# Patient Record
Sex: FEMALE | Race: WHITE | Employment: UNEMPLOYED | URBAN - METROPOLITAN AREA
[De-identification: names, ages, dates, MRNs, and addresses within clinical notes are randomized per-mention and may not be internally consistent; named-entity substitution may affect disease eponyms.]

---

## 2017-09-26 ENCOUNTER — TRANSFERRED RECORDS (OUTPATIENT)
Dept: HEALTH INFORMATION MANAGEMENT | Facility: CLINIC | Age: 32
End: 2017-09-26

## 2019-10-03 ENCOUNTER — MEDICAL CORRESPONDENCE (OUTPATIENT)
Dept: HEALTH INFORMATION MANAGEMENT | Facility: CLINIC | Age: 34
End: 2019-10-03

## 2019-10-03 ENCOUNTER — TRANSFERRED RECORDS (OUTPATIENT)
Dept: HEALTH INFORMATION MANAGEMENT | Facility: CLINIC | Age: 34
End: 2019-10-03

## 2019-10-08 ENCOUNTER — TRANSFERRED RECORDS (OUTPATIENT)
Dept: HEALTH INFORMATION MANAGEMENT | Facility: CLINIC | Age: 34
End: 2019-10-08

## 2019-10-16 ENCOUNTER — REFERRAL (OUTPATIENT)
Dept: TRANSPLANT | Facility: CLINIC | Age: 34
End: 2019-10-16

## 2019-10-16 NOTE — LETTER
Celia Munson  [address] [address] [place] [id number]        Dear Celia,    Thank you for your interest in the Transplant Center at [facility]. We look forward to being a part of your care team and assisting you through the transplant process.    As we discussed, your transplant coordinator is Elidia Moore (Kidney).  You may call your coordinator at any time with questions or concerns.  Your first scheduled call will be on 11/19/19.  If this needs to change, call [phone].    Please complete the following.    1. Fill out and return the enclosed forms    Authorization for Electronic Communication    Authorization to Discuss Protected Health Information    Authorization for Release of Protected Health Information    Authorization for Care Everywhere Release of Information    2. Sign up for:    Code71t, access to your electronic medical record (see enclosed pamphlet)    Daily Sales ExchangetransplantCharge Payment, a transplant education website    You can use these tools to learn more about your transplant, communicate with your care team, and track your medical details      Sincerely,      Solid Organ Transplant  [facility], [facility]    Cc: Marck Frances MD

## 2019-11-04 NOTE — TELEPHONE ENCOUNTER
[facility] called to follow up on the status. If the patient doesn't  please call the husbands number at [phone].

## 2019-11-04 NOTE — TELEPHONE ENCOUNTER
November 4, 2019 4:34 PM -  [id number]:   Message from Admin. Pola noted, patient's 's # verified in chart.

## 2019-11-07 VITALS — BODY MASS INDEX: 33.04 KG/M2 | HEIGHT: 61 IN | WEIGHT: 175 LBS

## 2019-11-07 ASSESSMENT — MIFFLIN-ST. JEOR: SCORE: 1441.17

## 2019-11-07 NOTE — TELEPHONE ENCOUNTER
PCP: NONE   Referring Provider: Yvrose  Referring Diagnosis:     Insurance information:  [place]  Policy mcfarlane:  SELF  Subscriber/policy/ID number:  Pt did not have info available  Group Number:      Is patient in a group home/assisted living? NO  Does patient have a guardian? NO    Referral intake process completed.  Patient is aware that after financial approval is received, medical records will be requested.   Patient confirmed for a callback from transplant coordinator on 11/19/19. (within 2 weeks)  Tentative evaluation date:  Not scheduled. (within 4 weeks)    Confirmed coordinator will discuss evaluation process in more detail at the time of their call.   Patient is aware of the need to arrange age appropriate cancer screening, vaccinations, and dental care.  Reminded patient to complete questionnaire, complete medical records release, and review packet prior to evaluation visit .  Assessed patient for special needs (ie--wheelchair, assistance, guardian, and ):  NONE   Patient instructed to call [phone] with questions.

## 2019-11-19 ENCOUNTER — TELEPHONE (OUTPATIENT)
Dept: TRANSPLANT | Facility: CLINIC | Age: 34
End: 2019-11-19

## 2019-11-19 DIAGNOSIS — Z87.891 HISTORY OF TOBACCO USE: ICD-10-CM

## 2019-11-19 DIAGNOSIS — N18.9 CHRONIC RENAL FAILURE: ICD-10-CM

## 2019-11-19 DIAGNOSIS — Z76.82 ORGAN TRANSPLANT CANDIDATE: ICD-10-CM

## 2019-11-19 DIAGNOSIS — Z01.818 PRE-TRANSPLANT EVALUATION FOR KIDNEY TRANSPLANT: ICD-10-CM

## 2019-11-19 DIAGNOSIS — Q61.5 CONGENITAL MEDULLARY CYSTIC KIDNEY: ICD-10-CM

## 2019-11-19 NOTE — TELEPHONE ENCOUNTER
Contacted patient and introduced myself and I provided Celia with Eboni Moore' name and number as she is the actual coordinator.  I described the role of the Transplant Coordinator in the transplant process.  Explained the purpose of this call including reviewing next steps and answering questions.  I confirmed Referring Provider (Dr Frances).  Celia does not yet have a Primary Care Physician and has been told to get one.      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  Recent GFR = 20.  She states there may be potential donors.  She denies cardiac problems or previous testing.  She states she smokes less that 1/2 pack per day and is trying to decrease.  She does not go to the dentist so I encouraged exam.  She is due for a pap smear.  She has been vaccinated for Hep B (is immune) but has not had Pneumovax or flu shot (I encouraged both).  BMI=33.  Reviewed components of transplant evaluation process including necessary appointments, tests, and procedures.  Answered questions for patient regarding evaluation, provided Amoss  name and contact information and requested they call with any additional questions.  Notified patients that they will hear from a Transplant  to schedule evaluation.

## 2019-11-20 NOTE — TELEPHONE ENCOUNTER
Called patient to schedule kidney eval, she confirmed for Thurs, Jan 2, 2020 w/Keys/Miriam.  Fedexing out this week to patient.

## 2019-12-26 ENCOUNTER — TELEPHONE (OUTPATIENT)
Dept: TRANSPLANT | Facility: CLINIC | Age: 34
End: 2019-12-26

## 2019-12-26 NOTE — TELEPHONE ENCOUNTER
Patient Call:   Bryce Munson left voice message on 12/24/19 at 1:01pm., Their insurance has changed not sure if they need to go through another Prior Approval  Wife Celia is scheduled for 01/02/2020 recipient   Evaluation.  Please call Celia/Bryce         Call back needed? Yes    Return Call Needed  Same as documented in contacts section  When to return call?: Same day: Route [facility]

## 2020-01-02 ENCOUNTER — ANCILLARY PROCEDURE (OUTPATIENT)
Dept: CARDIOLOGY | Facility: CLINIC | Age: 35
End: 2020-01-02
Attending: PHYSICIAN ASSISTANT
Payer: COMMERCIAL

## 2020-01-02 ENCOUNTER — ALLIED HEALTH/NURSE VISIT (OUTPATIENT)
Dept: TRANSPLANT | Facility: CLINIC | Age: 35
End: 2020-01-02
Attending: PHYSICIAN ASSISTANT
Payer: COMMERCIAL

## 2020-01-02 ENCOUNTER — DOCUMENTATION ONLY (OUTPATIENT)
Dept: TRANSPLANT | Facility: CLINIC | Age: 35
End: 2020-01-02

## 2020-01-02 ENCOUNTER — ANCILLARY PROCEDURE (OUTPATIENT)
Dept: GENERAL RADIOLOGY | Facility: CLINIC | Age: 35
End: 2020-01-02
Attending: PHYSICIAN ASSISTANT
Payer: COMMERCIAL

## 2020-01-02 VITALS
SYSTOLIC BLOOD PRESSURE: 128 MMHG | HEART RATE: 97 BPM | HEIGHT: 61 IN | TEMPERATURE: 98.4 F | DIASTOLIC BLOOD PRESSURE: 83 MMHG | WEIGHT: 181.7 LBS | BODY MASS INDEX: 34.31 KG/M2 | OXYGEN SATURATION: 97 %

## 2020-01-02 VITALS
BODY MASS INDEX: 34.31 KG/M2 | OXYGEN SATURATION: 97 % | TEMPERATURE: 98.4 F | DIASTOLIC BLOOD PRESSURE: 74 MMHG | HEIGHT: 61 IN | SYSTOLIC BLOOD PRESSURE: 114 MMHG | HEART RATE: 97 BPM | WEIGHT: 181.7 LBS

## 2020-01-02 DIAGNOSIS — N18.9 CHRONIC RENAL FAILURE: ICD-10-CM

## 2020-01-02 DIAGNOSIS — Z87.891 HISTORY OF TOBACCO USE: ICD-10-CM

## 2020-01-02 DIAGNOSIS — Z01.818 PRE-TRANSPLANT EVALUATION FOR KIDNEY TRANSPLANT: Primary | ICD-10-CM

## 2020-01-02 DIAGNOSIS — N18.4 CKD (CHRONIC KIDNEY DISEASE) STAGE 4, GFR 15-29 ML/MIN (H): ICD-10-CM

## 2020-01-02 DIAGNOSIS — N18.4 CKD (CHRONIC KIDNEY DISEASE) STAGE 4, GFR 15-29 ML/MIN (H): Primary | ICD-10-CM

## 2020-01-02 DIAGNOSIS — Z76.82 ORGAN TRANSPLANT CANDIDATE: Primary | ICD-10-CM

## 2020-01-02 DIAGNOSIS — Z72.0 CURRENT TOBACCO USE: ICD-10-CM

## 2020-01-02 DIAGNOSIS — Q61.5 CONGENITAL MEDULLARY CYSTIC KIDNEY: ICD-10-CM

## 2020-01-02 DIAGNOSIS — Z76.82 ORGAN TRANSPLANT CANDIDATE: ICD-10-CM

## 2020-01-02 DIAGNOSIS — Z01.818 PRE-TRANSPLANT EVALUATION FOR KIDNEY TRANSPLANT: ICD-10-CM

## 2020-01-02 LAB
ABO + RH BLD: NORMAL
ALBUMIN SERPL-MCNC: 3.6 G/DL (ref 3.4–5)
ALBUMIN UR-MCNC: 100 MG/DL
ALP SERPL-CCNC: 119 U/L (ref 40–150)
ALT SERPL W P-5'-P-CCNC: 21 U/L (ref 0–50)
ANION GAP SERPL CALCULATED.3IONS-SCNC: 5 MMOL/L (ref 3–14)
APPEARANCE UR: CLEAR
APTT PPP: 27 SEC (ref 22–37)
AST SERPL W P-5'-P-CCNC: 9 U/L (ref 0–45)
B-HCG SERPL-ACNC: <1 IU/L (ref 0–5)
BASOPHILS # BLD AUTO: 0.1 10E9/L (ref 0–0.2)
BASOPHILS NFR BLD AUTO: 0.5 %
BILIRUB SERPL-MCNC: 0.2 MG/DL (ref 0.2–1.3)
BILIRUB UR QL STRIP: NEGATIVE
BLD GP AB SCN SERPL QL: NORMAL
BLOOD BANK CMNT PATIENT-IMP: NORMAL
BUN SERPL-MCNC: 16 MG/DL (ref 7–30)
CALCIUM SERPL-MCNC: 8.9 MG/DL (ref 8.5–10.1)
CHLORIDE SERPL-SCNC: 106 MMOL/L (ref 94–109)
CO2 SERPL-SCNC: 24 MMOL/L (ref 20–32)
COLOR UR AUTO: ABNORMAL
CREAT SERPL-MCNC: 2.62 MG/DL (ref 0.52–1.04)
DIFFERENTIAL METHOD BLD: ABNORMAL
EOSINOPHIL # BLD AUTO: 0.4 10E9/L (ref 0–0.7)
EOSINOPHIL NFR BLD AUTO: 3.2 %
ERYTHROCYTE [DISTWIDTH] IN BLOOD BY AUTOMATED COUNT: 12.5 % (ref 10–15)
GFR SERPL CREATININE-BSD FRML MDRD: 23 ML/MIN/{1.73_M2}
GLUCOSE SERPL-MCNC: 80 MG/DL (ref 70–99)
GLUCOSE UR STRIP-MCNC: NEGATIVE MG/DL
HBV CORE AB SERPL QL IA: NONREACTIVE
HBV SURFACE AB SERPL IA-ACNC: 14.46 M[IU]/ML
HBV SURFACE AG SERPL QL IA: NONREACTIVE
HCT VFR BLD AUTO: 45.4 % (ref 35–47)
HCV AB SERPL QL IA: NONREACTIVE
HGB BLD-MCNC: 14.9 G/DL (ref 11.7–15.7)
HGB UR QL STRIP: NEGATIVE
HIV 1+2 AB+HIV1 P24 AG SERPL QL IA: NONREACTIVE
IMM GRANULOCYTES # BLD: 0.1 10E9/L (ref 0–0.4)
IMM GRANULOCYTES NFR BLD: 0.7 %
INR PPP: 0.95 (ref 0.86–1.14)
INTERPRETATION ECG - MUSE: NORMAL
KETONES UR STRIP-MCNC: NEGATIVE MG/DL
LEUKOCYTE ESTERASE UR QL STRIP: NEGATIVE
LYMPHOCYTES # BLD AUTO: 3.1 10E9/L (ref 0.8–5.3)
LYMPHOCYTES NFR BLD AUTO: 23.6 %
MCH RBC QN AUTO: 31.1 PG (ref 26.5–33)
MCHC RBC AUTO-ENTMCNC: 32.8 G/DL (ref 31.5–36.5)
MCV RBC AUTO: 95 FL (ref 78–100)
MONOCYTES # BLD AUTO: 0.6 10E9/L (ref 0–1.3)
MONOCYTES NFR BLD AUTO: 4.3 %
NEUTROPHILS # BLD AUTO: 8.8 10E9/L (ref 1.6–8.3)
NEUTROPHILS NFR BLD AUTO: 67.7 %
NITRATE UR QL: NEGATIVE
NRBC # BLD AUTO: 0 10*3/UL
NRBC BLD AUTO-RTO: 0 /100
PH UR STRIP: 6 PH (ref 5–7)
PLATELET # BLD AUTO: 182 10E9/L (ref 150–450)
POTASSIUM SERPL-SCNC: 3.4 MMOL/L (ref 3.4–5.3)
PROT SERPL-MCNC: 8 G/DL (ref 6.8–8.8)
RBC # BLD AUTO: 4.79 10E12/L (ref 3.8–5.2)
RBC #/AREA URNS AUTO: <1 /HPF (ref 0–2)
SODIUM SERPL-SCNC: 134 MMOL/L (ref 133–144)
SOURCE: ABNORMAL
SP GR UR STRIP: 1.01 (ref 1–1.03)
SPECIMEN EXP DATE BLD: NORMAL
SPECIMEN EXP DATE BLD: NORMAL
SQUAMOUS #/AREA URNS AUTO: 1 /HPF (ref 0–1)
UROBILINOGEN UR STRIP-MCNC: 0 MG/DL (ref 0–2)
WBC # BLD AUTO: 12.9 10E9/L (ref 4–11)
WBC #/AREA URNS AUTO: <1 /HPF (ref 0–5)

## 2020-01-02 PROCEDURE — 85670 THROMBIN TIME PLASMA: CPT | Performed by: PHYSICIAN ASSISTANT

## 2020-01-02 PROCEDURE — 86901 BLOOD TYPING SEROLOGIC RH(D): CPT | Performed by: PHYSICIAN ASSISTANT

## 2020-01-02 PROCEDURE — 87340 HEPATITIS B SURFACE AG IA: CPT | Performed by: PHYSICIAN ASSISTANT

## 2020-01-02 PROCEDURE — 36415 COLL VENOUS BLD VENIPUNCTURE: CPT | Performed by: PHYSICIAN ASSISTANT

## 2020-01-02 PROCEDURE — 86900 BLOOD TYPING SEROLOGIC ABO: CPT | Performed by: PHYSICIAN ASSISTANT

## 2020-01-02 PROCEDURE — 84702 CHORIONIC GONADOTROPIN TEST: CPT | Performed by: PHYSICIAN ASSISTANT

## 2020-01-02 PROCEDURE — 85610 PROTHROMBIN TIME: CPT | Performed by: PHYSICIAN ASSISTANT

## 2020-01-02 PROCEDURE — 86481 TB AG RESPONSE T-CELL SUSP: CPT | Performed by: PHYSICIAN ASSISTANT

## 2020-01-02 PROCEDURE — 86706 HEP B SURFACE ANTIBODY: CPT | Performed by: PHYSICIAN ASSISTANT

## 2020-01-02 PROCEDURE — G0463 HOSPITAL OUTPT CLINIC VISIT: HCPCS | Mod: ZF

## 2020-01-02 PROCEDURE — G0463 HOSPITAL OUTPT CLINIC VISIT: HCPCS | Mod: 27

## 2020-01-02 PROCEDURE — 85025 COMPLETE CBC W/AUTO DIFF WBC: CPT | Performed by: PHYSICIAN ASSISTANT

## 2020-01-02 PROCEDURE — 85730 THROMBOPLASTIN TIME PARTIAL: CPT | Performed by: PHYSICIAN ASSISTANT

## 2020-01-02 PROCEDURE — 86780 TREPONEMA PALLIDUM: CPT | Performed by: PHYSICIAN ASSISTANT

## 2020-01-02 PROCEDURE — 86886 COOMBS TEST INDIRECT TITER: CPT | Performed by: PHYSICIAN ASSISTANT

## 2020-01-02 PROCEDURE — 86803 HEPATITIS C AB TEST: CPT | Performed by: PHYSICIAN ASSISTANT

## 2020-01-02 PROCEDURE — 86147 CARDIOLIPIN ANTIBODY EA IG: CPT | Performed by: PHYSICIAN ASSISTANT

## 2020-01-02 PROCEDURE — 97802 MEDICAL NUTRITION INDIV IN: CPT | Mod: ZF

## 2020-01-02 PROCEDURE — 81240 F2 GENE: CPT | Performed by: PHYSICIAN ASSISTANT

## 2020-01-02 PROCEDURE — 86787 VARICELLA-ZOSTER ANTIBODY: CPT | Performed by: PHYSICIAN ASSISTANT

## 2020-01-02 PROCEDURE — 86850 RBC ANTIBODY SCREEN: CPT | Performed by: PHYSICIAN ASSISTANT

## 2020-01-02 PROCEDURE — 86704 HEP B CORE ANTIBODY TOTAL: CPT | Performed by: PHYSICIAN ASSISTANT

## 2020-01-02 PROCEDURE — 86644 CMV ANTIBODY: CPT | Performed by: PHYSICIAN ASSISTANT

## 2020-01-02 PROCEDURE — 81241 F5 GENE: CPT | Performed by: PHYSICIAN ASSISTANT

## 2020-01-02 PROCEDURE — 40000866 ZZHCL STATISTIC HIV 1/2 ANTIGEN/ANTIBODY PRETRANSPLANT ONLY: Performed by: PHYSICIAN ASSISTANT

## 2020-01-02 PROCEDURE — 86665 EPSTEIN-BARR CAPSID VCA: CPT | Performed by: PHYSICIAN ASSISTANT

## 2020-01-02 PROCEDURE — 86905 BLOOD TYPING RBC ANTIGENS: CPT | Performed by: PHYSICIAN ASSISTANT

## 2020-01-02 PROCEDURE — 80053 COMPREHEN METABOLIC PANEL: CPT | Performed by: PHYSICIAN ASSISTANT

## 2020-01-02 PROCEDURE — 81001 URINALYSIS AUTO W/SCOPE: CPT | Performed by: PHYSICIAN ASSISTANT

## 2020-01-02 PROCEDURE — 85613 RUSSELL VIPER VENOM DILUTED: CPT | Performed by: PHYSICIAN ASSISTANT

## 2020-01-02 RX ORDER — LOSARTAN POTASSIUM 25 MG/1
25 TABLET ORAL
COMMUNITY
Start: 2018-11-20

## 2020-01-02 ASSESSMENT — PAIN SCALES - GENERAL
PAINLEVEL: NO PAIN (0)
PAINLEVEL: NO PAIN (0)

## 2020-01-02 ASSESSMENT — MIFFLIN-ST. JEOR
SCORE: 1471.57
SCORE: 1471.57

## 2020-01-02 NOTE — PROGRESS NOTES
"Kidney Transplant Referral - 10/16/2019  Celia Munson attended the pre-transplant patient education class today with her .The [facility] website pre-transplant modules were viewed; class participants were educated on using the site. Pt.and  attentive and verbalized understanding of content presented.     Content reviewed:    Living Donation and how to access that program    Paired exchange    Kidney Donor Profile Index (KDPI)    Waiting list issues (right to decline without penalty, high PHS risk donors, what to expect when called with an offer)    Hospital experience,  length of stay , need to stay locally post-discharge (2-4 weeks)    Surgical options (with pictures)                             Post-surgery lifting and driving restrictions    Post-transplant routines, frequency of lab work and clinic visits    Need to stay locally post-discharge (2-4 weeks)    Role of Transplant Coordinator    Participants were informed of the benefits of transplant as well as potential risks such as infection, cancer, and death.  The need for total adherence with immunosuppression medications and following transplant regimens was stressed.  The overall evaluation/approval/listing process was reviewed.        The patient was provided with the following documents:  What You Need to Know About a Kidney Transplant  Adult Kidney Transplant - A Guide for Patients  SRTR Data Sheet - Kidney  Brochure - Kidney Allocation  Brochure - Multiple Listing and Waiting Time Transfer  What Every Patient Needs to Know (UNOS)  UNOS Facts and Figures  Finding a Donor  [facility] - Quick Start Guide  PI Consent  Receipt of Information form    Celia Munson signed the  Receipt of Information for Organ Transplant Recipient.\" She was provided Elidia Moore's business card and instructed to call with additional questions. She also was provided with several donor  cards.      Summary    Team s concerns/comments:  1. " Health Maintenance  2. CAD-low risk  3. Smoking    Candidacy category: Yellow    Action/Plan:  1.PAP, dental need to be updated.  2. No need for Cardiology at this time  3. Cessation. No PFTs ordered at this time        Expected Selection Meeting Discussion:1/9/2020

## 2020-01-02 NOTE — LETTER
1/2/2020      RE: Celia Munson  [address]  [facility] [address]       Assessment and Plan:  # Kidney Transplant Evaluation: Patient is a good candidate overall. Benefits of a living donor transplant were discussed.    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 Her eGFR has been relatively stable around 20-23 ml/min over the past few years. When ready, she would likely benefit from a kidney transplant.    # Cardiac Risk: she has no known history of cardiac disease or events and is asymptomatic at a low level of exertion. EKG and ECHO pending.    # Tobacco use: currently smoking about 1/2 PPD and has been doing so for 10+ years. She is trying to quit.     # Elevated WBC: patient asymptomatic. CXR and UA without evidence of infection. Recommend repeat in a few weeks with PCP.    # Health Maintenance: PAP: Not up to date and Dental: Not up to date    Discussed the risks and benefits of a transplant, including the risk of surgery and immunosuppression medications.  Patient's overall evaluation will be discussed in the Transplant Program's regular meeting with a final recommendation on the patients suitability for transplant to be made at that time.  Patient was seen in conjunction with Dr. Payam Cooper as part of a shared visit.    Evaluation:  Celia Munson was seen in consultation at the request of Dr. Lee Pineda for evaluation as a potential kidney transplant recipient.    Reason for Visit:  Celia Munson is a 34-year-old female with CKD who presents for kidney transplant evaluation.    History of Present Illness:         Kidney Disease Hx: History of recurrent UTI and vesicoureteral reflux s/p bilateral reimplantation of the ureters at age 8. Reports minimal-to-no follow up afterwards. Continued to have recurrent UTI's following surgery, but only 1 in the past 8-10 years. Then told she had cysts on her  kidneys in 2008 during her pregnancy (did have proteinuria, delivered healthy baby 4 weeks early). Had follow up with specialist afterwards, but was told there were no cysts, so again had no follow up. 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 MRI back at that time showed atypical appearance of the kidneys, which was followed by a renal US showing dysplastic kidneys and simple cysts, and she's been followed by nephrology since. Genetic testing was done for CAKUT, but no clinically significant alterations were identified. She did, however, have CHD1L variant, the clinical significance of which was unclear. Her eGFR has been 20-23 ml/min over the past 2 years.        Kidney Disease Dx: as above       Biopsy Proven: No         On Dialysis: No       Primary Nephrologist: Dr. Frances       H/o Kidney Stones: No       H/o Recurrent/Frequent UTI: Yes          Diabetic Hx: None           Cardiac/Vascular Disease Risk Factors:        Cardiac Risk Factors: Hypertension, CKD and Smoking       Known CAD: No       Known PAD/Caludication Symptoms: No       Known Heart Failure: No       Arrhythmia: No       Pulmonary Hypertension: No       Valvular Disease: No       Other: None         Volume Status/Weight:        Volume status: Euvolemic       Weight:  Acceptable BMI       BMI: Body mass index is 34.33 kg/m .         Functional Capacity/Frailty:        She is somewhat limited by lower back pain from her work-related injury, but can walk several blocks without chest pain, SOB, or claudication. She can walk up stairs, but does have some trouble related to her back.        Fatigue/Decreased Energy: [] No [x] Yes    Chest Pain or SOB with Exertion: [x] No [] Yes    Significant Weight Change: [x] No [] Yes    Nausea, Vomiting or Diarrhea: [x] No [] Yes    Fever, Sweats or Chills:  [x] No [] Yes    Leg Swelling [x] No [] Yes         History of Cancer: None    Other Significant Medical Issues:    None    Review of Systems:  A comprehensive review of systems was obtained and negative, except as noted in the HPI or PMH.    Past Medical History:   Medical record was reviewed and PMH was discussed with patient and noted below.  Past Medical History:   Diagnosis Date     CKD (chronic kidney disease) stage 4, GFR 15-29 ml/min (H)      Current tobacco use      Hypertension 2016     Multicystic dysplastic kidney      Uncomplicated asthma     as child     Vesicoureteral reflux        Past Social History:   Past Surgical History:   Procedure Laterality Date     REIMPLANT URETER CHILD  8 yrs old     TONSILLECTOMY & ADENOIDECTOMY  2011     TUBAL LIGATION  2012     Personal history of bleeding or anesthesia problems: No    Family History:  Family History   Problem Relation Age of Onset     Kidney Disease Niece        Personal History:   Social History     Socioeconomic History     Marital status:      Spouse name: Not on file     Number of children: Not on file     Years of education: Not on file     Highest education level: Not on file   Occupational History     Not on file   Social Needs     Financial resource strain: Not on file     Food insecurity:     Worry: Not on file     Inability: Not on file     Transportation needs:     Medical: Not on file     Non-medical: Not on file   Tobacco Use     Smoking status: Current Every Day Smoker     Packs/day: 0.50     Types: Cigarettes     Smokeless tobacco: Never Used   Substance and Sexual Activity     Alcohol use: Not Currently     Drug use: Not Currently     Sexual activity: Not on file   Lifestyle     Physical activity:     Days per week: Not on file     Minutes per session: Not on file     Stress: Not on file   Relationships     Social connections:     Talks on phone: Not on file     Gets together: Not on file     Attends Mosque service: Not on file     Active member of club or organization: Not on file     Attends meetings of clubs or organizations: Not on  "file     Relationship status: Not on file     Intimate partner violence:     Fear of current or ex partner: Not on file     Emotionally abused: Not on file     Physically abused: Not on file     Forced sexual activity: Not on file   Other Topics Concern     Parent/sibling w/ CABG, MI or angioplasty before 65F 55M? Not Asked   Social History Narrative     Not on file       Allergies:  No Known Allergies    Medications:  Current Outpatient Medications   Medication Sig     cholecalciferol (VITAMIN D-1000 MAX ST) 25 MCG (1000 UT) TABS Take 2,000 Units by mouth     losartan (COZAAR) 25 MG tablet Take 25 mg by mouth     No current facility-administered medications for this visit.        Vitals:  /74 (BP Location: Left arm, Patient Position: Sitting, Cuff Size: Adult Large)   Pulse 97   Temp 98.4  F (36.9  C) (Oral)   Ht 1.549 m (5' 1\")   Wt 82.4 kg (181 lb 11.2 oz)   SpO2 97%   BMI 34.33 kg/m       Exam:  GENERAL APPEARANCE: alert and no distress  HENT: mouth without ulcers or lesions. Fair dentition  LYMPHATICS: no cervical or supraclavicular nodes  RESP: lungs clear to auscultation - no rales, rhonchi or wheezes  CV: regular rhythm, normal rate, no rub, no murmur  EDEMA: no LE edema bilaterally  ABDOMEN: soft, nondistended, nontender  MS: extremities normal - no gross deformities noted, no evidence of inflammation in joints, no muscle tenderness  SKIN: no rash  DIALYSIS ACCESS:  None   Femoral pulses 2+    Results:   Recent Results (from the past 336 hour(s))   EKG 12-lead, tracing only [EKG1]    Collection Time: 01/02/20 11:41 AM   Result Value Ref Range    Interpretation ECG Click View Image link to view waveform and result    Routine UA with microscopic    Collection Time: 01/02/20 11:48 AM   Result Value Ref Range    Color Urine Straw     Appearance Urine Clear     Glucose Urine Negative NEG^Negative mg/dL    Bilirubin Urine Negative NEG^Negative    Ketones Urine Negative NEG^Negative mg/dL    Specific " Gravity Urine 1.006 1.003 - 1.035    Blood Urine Negative NEG^Negative    pH Urine 6.0 5.0 - 7.0 pH    Protein Albumin Urine 100 (A) NEG^Negative mg/dL    Urobilinogen mg/dL 0.0 0.0 - 2.0 mg/dL    Nitrite Urine Negative NEG^Negative    Leukocyte Esterase Urine Negative NEG^Negative    Source Midstream Urine     WBC Urine <1 0 - 5 /HPF    RBC Urine <1 0 - 2 /HPF    Squamous Epithelial /HPF Urine 1 0 - 1 /HPF   ABO type [[id number]]    Collection Time: 01/02/20 11:56 AM   Result Value Ref Range    ABO B     RH(D) Pos     Specimen Expires 01/05/2020    HLA Typing Complete SOT Recipient    Collection Time: 01/02/20 11:59 AM   Result Value Ref Range    ABTest Method SSOP     A* locus A*03     A* A*24     B* locus B*18     B* B*35     C* locus C*04     C* C*12     Bw-1 Bw*6     Drsso Test Method SSOP     DRB1* locus DRB1*01(103)     DRB1* DRB1*11     DRB3* locus DRB3*02     DQB1* locus DQB1*03(07)     DQB1* DQB1*05     DQA1*locus DQA1*01     DQA1* DQA1*05     DPB1* DPB1*02:01     DPB1* NMDP CDSHP     DPB1*locus DPB1*04:02     DPB1* locus NMDP CDNZA     DPA1* DPA1*01:03     DPA1* NMDP CCTGX    PRA Single Antigen IgG Antibody    Collection Time: 01/02/20 11:59 AM   Result Value Ref Range    SA1 Test Method SA FCS     SA1 Cell Class I     SA1 Hi Risk Linda None     SA1 Mod Risk Linda None     SA1 Comments        Test performed by modified procedure. Serum heat inactivated and tested   by a modified ([place]) protocol including fetal calf serum addition.   High-risk, mfi >3,000. Mod-risk, mfi 500-3,000.      SA2 Test Method SA FCS     SA2 Cell Class II     SA2 Hi Risk Linda None     SA2 Mod Risk Linda None     SA2 Comments        Test performed by modified procedure. Serum heat inactivated and tested   by a modified ([place]) protocol including fetal calf serum addition.   High-risk, mfi >3,000. Mod-risk, mfi 500-3,000.      Protocol Cutoff Plan A, 500 mfi cumulative      UNOS cPRA 0     Unacceptable Antigen None    CMV Antibody IgG  [[id number]]    Collection Time: 01/02/20 11:59 AM   Result Value Ref Range    CMV Antibody IgG 0.2 0.0 - 0.8 AI   EBV Capsid Antibody IgG [[id number]]    Collection Time: 01/02/20 11:59 AM   Result Value Ref Range    EBV Capsid Antibody IgG >8.0 (H) 0.0 - 0.8 AI   Hepatitis B core antibody [[id number]]    Collection Time: 01/02/20 11:59 AM   Result Value Ref Range    Hepatitis B Core Linda Nonreactive NR^Nonreactive   Hepatitis B Surface Antibody [[id number]]    Collection Time: 01/02/20 11:59 AM   Result Value Ref Range    Hepatitis B Surface Antibody 14.46 (H) <8.00 m[IU]/mL   Hepatitis B surface antigen [STE008]    Collection Time: 01/02/20 11:59 AM   Result Value Ref Range    Hep B Surface Agn Nonreactive NR^Nonreactive   Hepatitis C antibody [[id number]]    Collection Time: 01/02/20 11:59 AM   Result Value Ref Range    Hepatitis C Antibody Nonreactive NR^Nonreactive   HIV Antigen Antibody Combo Pretransplant    Collection Time: 01/02/20 11:59 AM   Result Value Ref Range    HIV Antigen Antibody Combo Pretransplant Nonreactive NR^Nonreactive   Varicella Zoster Virus Antibody IgG [[id number]]    Collection Time: 01/02/20 11:59 AM   Result Value Ref Range    Varicella Zoster Virus Antibody IgG >8.0 (H) 0.0 - 0.8 AI   Treponema Abs w Reflex to RPR and Titer    Collection Time: 01/02/20 11:59 AM   Result Value Ref Range    Treponema Antibodies Nonreactive NR^Nonreactive   HCG quantitative pregnancy    Collection Time: 01/02/20 11:59 AM   Result Value Ref Range    HCG Quantitative Serum <1 0 - 5 IU/L   Thrombin time [[id number]]    Collection Time: 01/02/20 11:59 AM   Result Value Ref Range    Thrombin Time 14.8 13.0 - 19.0 sec   Partial thromboplastin time [LAB56]    Collection Time: 01/02/20 11:59 AM   Result Value Ref Range    PTT 27 22 - 37 sec   INR [[id number]]    Collection Time: 01/02/20 11:59 AM   Result Value Ref Range    INR 0.95 0.86 - 1.14   Lupus Anticoagulant Panel [[id number]]    Collection Time: 01/02/20 11:59 AM   Result Value  Ref Range    Lupus Result Negative NEG^Negative   Cardiolipin Linda IgG and IgM [LAB 6836]    Collection Time: 01/02/20 11:59 AM   Result Value Ref Range    Cardiolipin Antibody IgG <1.6 0.0 - 19.9 GPL-U/mL    Cardiolipin Antibody IgM <0.2 0.0 - 19.9 MPL-U/mL   CBC with platelets differential [[id number]]    Collection Time: 01/02/20 11:59 AM   Result Value Ref Range    WBC 12.9 (H) 4.0 - 11.0 10e9/L    RBC Count 4.79 3.8 - 5.2 10e12/L    Hemoglobin 14.9 11.7 - 15.7 g/dL    Hematocrit 45.4 35.0 - 47.0 %    MCV 95 78 - 100 fl    MCH 31.1 26.5 - 33.0 pg    MCHC 32.8 31.5 - 36.5 g/dL    RDW 12.5 10.0 - 15.0 %    Platelet Count 182 150 - 450 10e9/L    Diff Method Automated Method     % Neutrophils 67.7 %    % Lymphocytes 23.6 %    % Monocytes 4.3 %    % Eosinophils 3.2 %    % Basophils 0.5 %    % Immature Granulocytes 0.7 %    Nucleated RBCs 0 0 /100    Absolute Neutrophil 8.8 (H) 1.6 - 8.3 10e9/L    Absolute Lymphocytes 3.1 0.8 - 5.3 10e9/L    Absolute Monocytes 0.6 0.0 - 1.3 10e9/L    Absolute Eosinophils 0.4 0.0 - 0.7 10e9/L    Absolute Basophils 0.1 0.0 - 0.2 10e9/L    Abs Immature Granulocytes 0.1 0 - 0.4 10e9/L    Absolute Nucleated RBC 0.0    Quantiferon TB Gold Plus    Collection Time: 01/02/20 11:59 AM   Result Value Ref Range    Quantiferon-TB Gold Plus Result Negative NEG^Negative    TB1 Ag minus Nil Value 0.00 IU/mL    TB2 Ag minus Nil Value 0.00 IU/mL    Mitogen minus Nil Result >10.00 IU/mL    Nil Result 0.04 IU/mL   Comprehensive metabolic panel [LAB17]    Collection Time: 01/02/20 11:59 AM   Result Value Ref Range    Sodium 134 133 - 144 mmol/L    Potassium 3.4 3.4 - 5.3 mmol/L    Chloride 106 94 - 109 mmol/L    Carbon Dioxide 24 20 - 32 mmol/L    Anion Gap 5 3 - 14 mmol/L    Glucose 80 70 - 99 mg/dL    Urea Nitrogen 16 7 - 30 mg/dL    Creatinine 2.62 (H) 0.52 - 1.04 mg/dL    GFR Estimate 23 (L) >60 mL/min/[1.73_m2]    GFR Estimate If Black 27 (L) >60 mL/min/[1.73_m2]    Calcium 8.9 8.5 - 10.1 mg/dL     Bilirubin Total 0.2 0.2 - 1.3 mg/dL    Albumin 3.6 3.4 - 5.0 g/dL    Protein Total 8.0 6.8 - 8.8 g/dL    Alkaline Phosphatase 119 40 - 150 U/L    ALT 21 0 - 50 U/L    AST 9 0 - 45 U/L   Blood Group A Subtype [[id number]]    Collection Time: 01/02/20 11:59 AM   Result Value Ref Range    Antigen Type Canceled, Test credited     Blood Bank Comment       Patient is not type A or AB. A subtying not applicable. 1/2/20 LH   Antibody titer red cell [[id number]]    Collection Time: 01/02/20 11:59 AM   Result Value Ref Range    Antibody Titer Anti A1: IgM 8, IgG 16  Anti A2: IgM 2, IgG 4      ABO/Rh type and screen    Collection Time: 01/02/20 11:59 AM   Result Value Ref Range    ABO B     RH(D) Pos     Antibody Screen Neg     Test Valid Only At          [facility]    Specimen Expires 01/05/2020            Patient was seen by myself, Dr. Payam Cooper, in conjunction with Leeanne Berman, as part of a shared visit.    I personally reviewed past medical and surgical history, vital signs, medications and labs.  Present and past medical history, along with significant physical exam findings were all reviewed with ABDOULAYE.    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    Patient with urine reflux as child and underwent b ureteral reimplantation at age 8.    Has bilateral cysts in her kidneys as well.    Saw Dr Frances in 2016 and genetic testing was performed, no clear cause for kidney disease.    No other family hx of kidney disease.    GFR 20-23 over last 2 years.     Mild decreased appetite. No cp, sob, no n/v, no f/s/c. Back pain from prior back strain and lifting.     + cigs 1/2 ppd x 10 years. Working on quitting.  Needs pap and dental. BMI 34.    Key management decisions made by me and discussed with ABDOULAYE:  1. Kidney transplant evaluation - patient is a  good candidate overall. Benefits of a living donor transplant were discussed.  2. 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 HTN: good control  4. Ca screens: get pap, needs dental up date.  5. CAD: low risk given age. No need for cardiology at this time.  6. Tobacco: recommend cessation.        PKE

## 2020-01-02 NOTE — PROGRESS NOTES
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 She eats 1 meal per day with no snacking. About 1 day per week, her appetite is so poor that she does not eat anything.     Vitamins, Supplements, Pertinent Meds: Vit D  Herbal Medicines/Supplements: None    Diet Recall  Breakfast skips   Lunch skips   Dinner Homemade lasCandis ashley Tot hotdish, \"whatever her kids will eat\"     Snacks None    Beverages Water, 4 x 14 oz bottles of Mtn Dew per day   Alcohol None    Dining out 1 meal per month      Physical Activity  No designated exercise time      Anthropometrics  Height:   61 in   BMI:    34.3 kg/m2    Weight Status:Obesity Grade I BMI 30-34.9   Weight:  182 lbs (82 kg)       Wt Readings from Last 10 Encounters:   01/02/20 82.4 kg (181 lb 11.2 oz)   01/02/20 82.4 kg (181 lb 11.2 oz)   11/07/19 79.4 kg (175 lb)       IBW (lb): 105 #  % IBW: 173%    Wt Hx: The pt has gained 6# over the last 13 months.     Adj/dosing BW: 124 lbs/56 kg       Labs  Fast Glucose: 88 mg/dL (WNL) (09/30/19)    Potassium   Date Value Ref Range Status   01/02/2020 3.4 3.4 - 5.3 mmol/L Final     PHOSPHORUS: 3.1 mg/dL (WNL) (09/30/19)    Malnutrition  % Intake: <75% for >/= 3 months (non-severe malnutrition)  % Weight Loss: None noted  Subcutaneous Fat Loss: None noted   Muscle Loss: None noted  Fluid Accumulation/Edema: None noted  Malnutrition Diagnosis: Patient does not meet two of the above criteria necessary for diagnosing malnutrition    Estimated Nutrition " Needs  Energy  [id number]     (25-30 kcal/kg for maintenance) Protein  45-56    (0.8-1 g/kg for CKD)       Fluid  1 ml/kcal or per MD   Micronutrient   Na+: <2000 mg/day  K+: 8777-3687 mg/day  Phos: 800-1000 mg/day            Nutrition Diagnosis  Food and nutrition related knowledge deficit r/t pre kidney transplant eval AEB pt verbalized not hearing pre/post transplant diet guidelines.    Nutrition Intervention  Nutrition education provided:  Discussed sodium intake (low sodium foods and drinks, seasoning food without salt and tips for low sodium diet).    Discussed the importance of adequate protein/energy intake before transplant. Encourage the pt to eat at least BID meals even if her appetite continue to be poor.    Nutrition education - discussed the principle of My Plate and encouraged the pt to incorporate more fruit/veggies and lean proteins into her diet.     Reviewed post txp diet guidelines in brief (will review in further detail post txp):  (1) Review of proper food safety measures d/t immunosuppressant therapy post-op and increased risk for food-borne illness    (2) Avoid the following post txp d/t risk for rejection, unknown effects on the organs, and/or potential interactions with immunosuppressants:  - Herbal, Chinese, holistic, chiropractic, natural, alternative medicines and supplements  - Detoxes and cleanses  - Weight loss pills  - Protein powders or other products with extracts or herbs (ie green tea extract)    (3) Med regimen and possible side effects    Patient Understanding: Pt verbalized understanding of education provided.  Expected Compliance: Good   Follow-Up Plans: PRN     Nutrition Goals  1. Limit Na+ <2000mg/day  2. Pt to verbalize understanding of 3 aspects of post txp education provided     Delma Up RD, LD  [facility] [phone]

## 2020-01-02 NOTE — PROGRESS NOTES
Patient was seen by myself, Dr. Payam Cooper, in conjunction with Leeanne Berman, as part of a shared visit.    I personally reviewed past medical and surgical history, vital signs, medications and labs.  Present and past medical history, along with significant physical exam findings were all reviewed with ABDOULAYE.    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    Patient with urine reflux as child and underwent b ureteral reimplantation at age 8.    Has bilateral cysts in her kidneys as well.    Saw Dr Frances in 2016 and genetic testing was performed, no clear cause for kidney disease.    No other family hx of kidney disease.    GFR 20-23 over last 2 years.     Mild decreased appetite. No cp, sob, no n/v, no f/s/c. Back pain from prior back strain and lifting.     + cigs 1/2 ppd x 10 years. Working on quitting.  Needs pap and dental. BMI 34.    Key management decisions made by me and discussed with ABDOULAYE:  1. Kidney transplant evaluation - patient is a good candidate overall. Benefits of a living donor transplant were discussed.  2. 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 HTN: good control  4. Ca screens: get pap, needs dental up date.  5. CAD: low risk given age. No need for cardiology at this time.  6. Tobacco: recommend cessation.

## 2020-01-02 NOTE — PROGRESS NOTES
RE: Celia Munson    [facility]# [id number]        I saw your patient, Celia Munson, in consultation in our pretransplant clinic.  She was at clinic to discuss care for her end-stage renal disease.  She was at clinic with her .  Prior to clinic, they attended our pretransplant class.       We talked about the pros and cons of transplantation vs. dialysis.  We discussed the fact that it was important that she think about the pros and cons of each treatment option and make an active decision.  We also discussed the fact that the two were interconnected and she may need to go on dialysis before transplant (if she chose to have a transplant) and that if the transplant failed, she might need dialysis before another transplant.       We also discussed the fact that if she chose to have a transplant, she would need to decide between going on the wait list for a  donor transplant vs. having a living donor transplant.  We talked about the pros and cons of each option.  Although I didn't express an opinion regarding transplantation or dialysis, I suggested that if she chose to have a transplant, a living donor transplant would be preferable in that the surgery is the same, the immunosuppressive drugs and the risks are the same, but the transplant could be done sooner and the results are better.  I told her that the wait for  donor kidney was approximately five years for patients who are newly put on the waiting list.  In addition, we talked about the fact that the disadvantage of a living donor transplant was the risk to the donor.       Because she was interested in living donation, we spent some time discussing donor risks, including the risks of mortality, morbidity, and long-term risks with a single kidney. I also provided her with our donor DVD to view and share at her leisure.      I attempted to answer any remaining questions.  I also told her that should she have any questions, she should feel free  to contact us.  We would be glad to answer any questions either over the phone or at another clinic visit.  Her transplant coordinator is Eboni Moore and may be reached at [phone].  Thank you for the opportunity to see her.     I spent 20 minutes with this patient.  Over 90% of that time was spent in counseling and coordination of care.             Yours truly,               Lee Pineda MD         Professor of Surgery         ([phone])    SAIRA/

## 2020-01-02 NOTE — NURSING NOTE
"Chief Complaint   Patient presents with     Consult     Pre kidney eval     Blood pressure 128/83, pulse 97, temperature 98.4  F (36.9  C), temperature source Oral, height 1.549 m (5' 1\"), weight 82.4 kg (181 lb 11.2 oz), SpO2 97 %.    Grace Garcia on 1/2/2020 at 8:12 AM    "

## 2020-01-02 NOTE — LETTER
2020      RE: Celia Munson  [address]  [facility] [address]       RE: Celia Munson    [facility]# [id number]        I saw your patient, Celia Munson, in consultation in our pretransplant clinic.  She was at clinic to discuss care for her end-stage renal disease.  She was at clinic with her .  Prior to clinic, they attended our pretransplant class.       We talked about the pros and cons of transplantation vs. dialysis.  We discussed the fact that it was important that she think about the pros and cons of each treatment option and make an active decision.  We also discussed the fact that the two were interconnected and she may need to go on dialysis before transplant (if she chose to have a transplant) and that if the transplant failed, she might need dialysis before another transplant.       We also discussed the fact that if she chose to have a transplant, she would need to decide between going on the wait list for a  donor transplant vs. having a living donor transplant.  We talked about the pros and cons of each option.  Although I didn't express an opinion regarding transplantation or dialysis, I suggested that if she chose to have a transplant, a living donor transplant would be preferable in that the surgery is the same, the immunosuppressive drugs and the risks are the same, but the transplant could be done sooner and the results are better.  I told her that the wait for  donor kidney was approximately five years for patients who are newly put on the waiting list.  In addition, we talked about the fact that the disadvantage of a living donor transplant was the risk to the donor.       Because she was interested in living donation, we spent some time discussing donor risks, including the risks of mortality, morbidity, and long-term risks with a single kidney. I also provided her with our donor DVD to view and share at her leisure.      I attempted to answer any remaining questions.   I also told her that should she have any questions, she should feel free to contact us.  We would be glad to answer any questions either over the phone or at another clinic visit.  Her transplant coordinator is Eboni Moore and may be reached at [phone].  Thank you for the opportunity to see her.     I spent 20 minutes with this patient.  Over 90% of that time was spent in counseling and coordination of care.             Yours truly,               Lee Pineda MD         Professor of Surgery         ([phone])    SAIRA/st RONQUILLO

## 2020-01-02 NOTE — PROGRESS NOTES
Assessment and Plan:  # Kidney Transplant Evaluation: Patient is a good candidate overall. Benefits of a living donor transplant were discussed.    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 Her eGFR has been relatively stable around 20-23 ml/min over the past few years. When ready, she would likely benefit from a kidney transplant.    # Cardiac Risk: she has no known history of cardiac disease or events and is asymptomatic at a low level of exertion. EKG and ECHO pending.    # Tobacco use: currently smoking about 1/2 PPD and has been doing so for 10+ years. She is trying to quit.     # Elevated WBC: patient asymptomatic. CXR and UA without evidence of infection. Recommend repeat in a few weeks with PCP.    # Health Maintenance: PAP: Not up to date and Dental: Not up to date    Discussed the risks and benefits of a transplant, including the risk of surgery and immunosuppression medications.  Patient's overall evaluation will be discussed in the Transplant Program's regular meeting with a final recommendation on the patients suitability for transplant to be made at that time.  Patient was seen in conjunction with Dr. Payam Cooper as part of a shared visit.    Evaluation:  Celia Munson was seen in consultation at the request of Dr. Lee Pineda for evaluation as a potential kidney transplant recipient.    Reason for Visit:  Celia Munson is a 34-year-old female with CKD who presents for kidney transplant evaluation.    History of Present Illness:         Kidney Disease Hx: History of recurrent UTI and vesicoureteral reflux s/p bilateral reimplantation of the ureters at age 8. Reports minimal-to-no follow up afterwards. Continued to have recurrent UTI's following surgery, but only 1 in the past 8-10 years. Then told she had cysts on her kidneys in 2008 during her pregnancy (did have proteinuria, delivered  healthy baby 4 weeks early). Had follow up with specialist afterwards, but was told there were no cysts, so again had no follow up. 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 MRI back at that time showed atypical appearance of the kidneys, which was followed by a renal US showing dysplastic kidneys and simple cysts, and she's been followed by nephrology since. Genetic testing was done for CAKUT, but no clinically significant alterations were identified. She did, however, have CHD1L variant, the clinical significance of which was unclear. Her eGFR has been 20-23 ml/min over the past 2 years.        Kidney Disease Dx: as above       Biopsy Proven: No         On Dialysis: No       Primary Nephrologist: Dr. Frances       H/o Kidney Stones: No       H/o Recurrent/Frequent UTI: Yes          Diabetic Hx: None           Cardiac/Vascular Disease Risk Factors:        Cardiac Risk Factors: Hypertension, CKD and Smoking       Known CAD: No       Known PAD/Caludication Symptoms: No       Known Heart Failure: No       Arrhythmia: No       Pulmonary Hypertension: No       Valvular Disease: No       Other: None         Volume Status/Weight:        Volume status: Euvolemic       Weight:  Acceptable BMI       BMI: Body mass index is 34.33 kg/m .         Functional Capacity/Frailty:        She is somewhat limited by lower back pain from her work-related injury, but can walk several blocks without chest pain, SOB, or claudication. She can walk up stairs, but does have some trouble related to her back.        Fatigue/Decreased Energy: [] No [x] Yes    Chest Pain or SOB with Exertion: [x] No [] Yes    Significant Weight Change: [x] No [] Yes    Nausea, Vomiting or Diarrhea: [x] No [] Yes    Fever, Sweats or Chills:  [x] No [] Yes    Leg Swelling [x] No [] Yes         History of Cancer: None    Other Significant Medical Issues:   None    Review of Systems:  A comprehensive review of systems was obtained and  negative, except as noted in the HPI or PMH.    Past Medical History:   Medical record was reviewed and PMH was discussed with patient and noted below.  Past Medical History:   Diagnosis Date     CKD (chronic kidney disease) stage 4, GFR 15-29 ml/min (H)      Current tobacco use      Hypertension 2016     Multicystic dysplastic kidney      Uncomplicated asthma     as child     Vesicoureteral reflux        Past Social History:   Past Surgical History:   Procedure Laterality Date     REIMPLANT URETER CHILD  8 yrs old     TONSILLECTOMY & ADENOIDECTOMY  2011     TUBAL LIGATION  2012     Personal history of bleeding or anesthesia problems: No    Family History:  Family History   Problem Relation Age of Onset     Kidney Disease Niece        Personal History:   Social History     Socioeconomic History     Marital status:      Spouse name: Not on file     Number of children: Not on file     Years of education: Not on file     Highest education level: Not on file   Occupational History     Not on file   Social Needs     Financial resource strain: Not on file     Food insecurity:     Worry: Not on file     Inability: Not on file     Transportation needs:     Medical: Not on file     Non-medical: Not on file   Tobacco Use     Smoking status: Current Every Day Smoker     Packs/day: 0.50     Types: Cigarettes     Smokeless tobacco: Never Used   Substance and Sexual Activity     Alcohol use: Not Currently     Drug use: Not Currently     Sexual activity: Not on file   Lifestyle     Physical activity:     Days per week: Not on file     Minutes per session: Not on file     Stress: Not on file   Relationships     Social connections:     Talks on phone: Not on file     Gets together: Not on file     Attends Yazidi service: Not on file     Active member of club or organization: Not on file     Attends meetings of clubs or organizations: Not on file     Relationship status: Not on file     Intimate partner violence:     Fear  "of current or ex partner: Not on file     Emotionally abused: Not on file     Physically abused: Not on file     Forced sexual activity: Not on file   Other Topics Concern     Parent/sibling w/ CABG, MI or angioplasty before 65F 55M? Not Asked   Social History Narrative     Not on file       Allergies:  No Known Allergies    Medications:  Current Outpatient Medications   Medication Sig     cholecalciferol (VITAMIN D-1000 MAX ST) 25 MCG (1000 UT) TABS Take 2,000 Units by mouth     losartan (COZAAR) 25 MG tablet Take 25 mg by mouth     No current facility-administered medications for this visit.        Vitals:  /74 (BP Location: Left arm, Patient Position: Sitting, Cuff Size: Adult Large)   Pulse 97   Temp 98.4  F (36.9  C) (Oral)   Ht 1.549 m (5' 1\")   Wt 82.4 kg (181 lb 11.2 oz)   SpO2 97%   BMI 34.33 kg/m      Exam:  GENERAL APPEARANCE: alert and no distress  HENT: mouth without ulcers or lesions. Fair dentition  LYMPHATICS: no cervical or supraclavicular nodes  RESP: lungs clear to auscultation - no rales, rhonchi or wheezes  CV: regular rhythm, normal rate, no rub, no murmur  EDEMA: no LE edema bilaterally  ABDOMEN: soft, nondistended, nontender  MS: extremities normal - no gross deformities noted, no evidence of inflammation in joints, no muscle tenderness  SKIN: no rash  DIALYSIS ACCESS:  None   Femoral pulses 2+    Results:   Recent Results (from the past 336 hour(s))   EKG 12-lead, tracing only [EKG1]    Collection Time: 01/02/20 11:41 AM   Result Value Ref Range    Interpretation ECG Click View Image link to view waveform and result    Routine UA with microscopic    Collection Time: 01/02/20 11:48 AM   Result Value Ref Range    Color Urine Straw     Appearance Urine Clear     Glucose Urine Negative NEG^Negative mg/dL    Bilirubin Urine Negative NEG^Negative    Ketones Urine Negative NEG^Negative mg/dL    Specific Gravity Urine 1.006 1.003 - 1.035    Blood Urine Negative NEG^Negative    pH Urine " 6.0 5.0 - 7.0 pH    Protein Albumin Urine 100 (A) NEG^Negative mg/dL    Urobilinogen mg/dL 0.0 0.0 - 2.0 mg/dL    Nitrite Urine Negative NEG^Negative    Leukocyte Esterase Urine Negative NEG^Negative    Source Midstream Urine     WBC Urine <1 0 - 5 /HPF    RBC Urine <1 0 - 2 /HPF    Squamous Epithelial /HPF Urine 1 0 - 1 /HPF   ABO type [EZG2626]    Collection Time: 01/02/20 11:56 AM   Result Value Ref Range    ABO B     RH(D) Pos     Specimen Expires 01/05/2020    HLA Typing Complete SOT Recipient    Collection Time: 01/02/20 11:59 AM   Result Value Ref Range    ABTest Method SSOP     A* locus A*03     A* A*24     B* locus B*18     B* B*35     C* locus C*04     C* C*12     Bw-1 Bw*6     Drsso Test Method SSOP     DRB1* locus DRB1*01(103)     DRB1* DRB1*11     DRB3* locus DRB3*02     DQB1* locus DQB1*03(07)     DQB1* DQB1*05     DQA1*locus DQA1*01     DQA1* DQA1*05     DPB1* DPB1*02:01     DPB1* NMDP CDSHP     DPB1*locus DPB1*04:02     DPB1* locus NMDP CDNZA     DPA1* DPA1*01:03     DPA1* NMDP CCTGX    PRA Single Antigen IgG Antibody    Collection Time: 01/02/20 11:59 AM   Result Value Ref Range    SA1 Test Method SA FCS     SA1 Cell Class I     SA1 Hi Risk Linda None     SA1 Mod Risk Linad None     SA1 Comments        Test performed by modified procedure. Serum heat inactivated and tested   by a modified ([place]) protocol including fetal calf serum addition.   High-risk, mfi >3,000. Mod-risk, mfi 500-3,000.      SA2 Test Method SA FCS     SA2 Cell Class II     SA2 Hi Risk Linda None     SA2 Mod Risk Linda None     SA2 Comments        Test performed by modified procedure. Serum heat inactivated and tested   by a modified ([place]) protocol including fetal calf serum addition.   High-risk, mfi >3,000. Mod-risk, mfi 500-3,000.      Protocol Cutoff Plan A, 500 mfi cumulative      UNOS cPRA 0     Unacceptable Antigen None    CMV Antibody IgG [[id number]]    Collection Time: 01/02/20 11:59 AM   Result Value Ref Range    CMV Antibody  IgG 0.2 0.0 - 0.8 AI   EBV Capsid Antibody IgG [[id number]]    Collection Time: 01/02/20 11:59 AM   Result Value Ref Range    EBV Capsid Antibody IgG >8.0 (H) 0.0 - 0.8 AI   Hepatitis B core antibody [[id number]]    Collection Time: 01/02/20 11:59 AM   Result Value Ref Range    Hepatitis B Core Linda Nonreactive NR^Nonreactive   Hepatitis B Surface Antibody [[id number]]    Collection Time: 01/02/20 11:59 AM   Result Value Ref Range    Hepatitis B Surface Antibody 14.46 (H) <8.00 m[IU]/mL   Hepatitis B surface antigen [[id number]]    Collection Time: 01/02/20 11:59 AM   Result Value Ref Range    Hep B Surface Agn Nonreactive NR^Nonreactive   Hepatitis C antibody [[id number]]    Collection Time: 01/02/20 11:59 AM   Result Value Ref Range    Hepatitis C Antibody Nonreactive NR^Nonreactive   HIV Antigen Antibody Combo Pretransplant    Collection Time: 01/02/20 11:59 AM   Result Value Ref Range    HIV Antigen Antibody Combo Pretransplant Nonreactive NR^Nonreactive   Varicella Zoster Virus Antibody IgG [[id number]]    Collection Time: 01/02/20 11:59 AM   Result Value Ref Range    Varicella Zoster Virus Antibody IgG >8.0 (H) 0.0 - 0.8 AI   Treponema Abs w Reflex to RPR and Titer    Collection Time: 01/02/20 11:59 AM   Result Value Ref Range    Treponema Antibodies Nonreactive NR^Nonreactive   HCG quantitative pregnancy    Collection Time: 01/02/20 11:59 AM   Result Value Ref Range    HCG Quantitative Serum <1 0 - 5 IU/L   Thrombin time [[id number]]    Collection Time: 01/02/20 11:59 AM   Result Value Ref Range    Thrombin Time 14.8 13.0 - 19.0 sec   Partial thromboplastin time [LAB56]    Collection Time: 01/02/20 11:59 AM   Result Value Ref Range    PTT 27 22 - 37 sec   INR [[id number]]    Collection Time: 01/02/20 11:59 AM   Result Value Ref Range    INR 0.95 0.86 - 1.14   Lupus Anticoagulant Panel [[id number]]    Collection Time: 01/02/20 11:59 AM   Result Value Ref Range    Lupus Result Negative NEG^Negative   Cardiolipin Linda IgG and IgM [LAB 6836]     Collection Time: 01/02/20 11:59 AM   Result Value Ref Range    Cardiolipin Antibody IgG <1.6 0.0 - 19.9 GPL-U/mL    Cardiolipin Antibody IgM <0.2 0.0 - 19.9 MPL-U/mL   CBC with platelets differential [[id number]]    Collection Time: 01/02/20 11:59 AM   Result Value Ref Range    WBC 12.9 (H) 4.0 - 11.0 10e9/L    RBC Count 4.79 3.8 - 5.2 10e12/L    Hemoglobin 14.9 11.7 - 15.7 g/dL    Hematocrit 45.4 35.0 - 47.0 %    MCV 95 78 - 100 fl    MCH 31.1 26.5 - 33.0 pg    MCHC 32.8 31.5 - 36.5 g/dL    RDW 12.5 10.0 - 15.0 %    Platelet Count 182 150 - 450 10e9/L    Diff Method Automated Method     % Neutrophils 67.7 %    % Lymphocytes 23.6 %    % Monocytes 4.3 %    % Eosinophils 3.2 %    % Basophils 0.5 %    % Immature Granulocytes 0.7 %    Nucleated RBCs 0 0 /100    Absolute Neutrophil 8.8 (H) 1.6 - 8.3 10e9/L    Absolute Lymphocytes 3.1 0.8 - 5.3 10e9/L    Absolute Monocytes 0.6 0.0 - 1.3 10e9/L    Absolute Eosinophils 0.4 0.0 - 0.7 10e9/L    Absolute Basophils 0.1 0.0 - 0.2 10e9/L    Abs Immature Granulocytes 0.1 0 - 0.4 10e9/L    Absolute Nucleated RBC 0.0    Quantiferon TB Gold Plus    Collection Time: 01/02/20 11:59 AM   Result Value Ref Range    Quantiferon-TB Gold Plus Result Negative NEG^Negative    TB1 Ag minus Nil Value 0.00 IU/mL    TB2 Ag minus Nil Value 0.00 IU/mL    Mitogen minus Nil Result >10.00 IU/mL    Nil Result 0.04 IU/mL   Comprehensive metabolic panel [LAB17]    Collection Time: 01/02/20 11:59 AM   Result Value Ref Range    Sodium 134 133 - 144 mmol/L    Potassium 3.4 3.4 - 5.3 mmol/L    Chloride 106 94 - 109 mmol/L    Carbon Dioxide 24 20 - 32 mmol/L    Anion Gap 5 3 - 14 mmol/L    Glucose 80 70 - 99 mg/dL    Urea Nitrogen 16 7 - 30 mg/dL    Creatinine 2.62 (H) 0.52 - 1.04 mg/dL    GFR Estimate 23 (L) >60 mL/min/[1.73_m2]    GFR Estimate If Black 27 (L) >60 mL/min/[1.73_m2]    Calcium 8.9 8.5 - 10.1 mg/dL    Bilirubin Total 0.2 0.2 - 1.3 mg/dL    Albumin 3.6 3.4 - 5.0 g/dL    Protein Total 8.0 6.8 - 8.8  g/dL    Alkaline Phosphatase 119 40 - 150 U/L    ALT 21 0 - 50 U/L    AST 9 0 - 45 U/L   Blood Group A Subtype [[id number]]    Collection Time: 01/02/20 11:59 AM   Result Value Ref Range    Antigen Type Canceled, Test credited     Blood Bank Comment       Patient is not type A or AB. A subtying not applicable. 1/2/20 LH   Antibody titer red cell [[id number]]    Collection Time: 01/02/20 11:59 AM   Result Value Ref Range    Antibody Titer Anti A1: IgM 8, IgG 16  Anti A2: IgM 2, IgG 4      ABO/Rh type and screen    Collection Time: 01/02/20 11:59 AM   Result Value Ref Range    ABO B     RH(D) Pos     Antibody Screen Neg     Test Valid Only At          [facility]    Specimen Expires 01/05/2020

## 2020-01-02 NOTE — PROGRESS NOTES
Psychosocial Assessment  Patient Name/ Age: Celia Munson 34 year old   Medical Record #: [id number]  Duration of Interview:     40  min  Process:   Face-to-Face Interview                (counseling < 50%)   Present at Appointment: Celia and her  Bryce        :URSZULA Johnson, [facility] Date:  January 2, 2020        Type of transplant: Kidney    Donor type:   Celia indicated friends have expressed interest in being a donor.   Cadaver and friend   Prior Transplants:    No Status of Transplant:       Current Living Situation    Location:   [address] [place] [address]  With Whom: Bryce and children Cristino (13), Adal (12), Jun (11) and Evangelist (8)       Family/ Social Support:    Celia's parents live in [place], [place].  She has one brother ([place], [place]) and one sister ([place], [place]).   Available, helpful   Committed relationship:  Celia and Bryce have been  for three years.   Stable/supportive   Other supports:   Friends Available, helpful       Activities/ Functional Ability    Current level:  Celia wears corrective lenses. 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  Does receive unemployment on the off season.   Spouse's income   Insurance  [employer]    At this time, patient can afford medication costs:  Yes  MA       Medical Status    Current mode of treatment for ESRD None   Complications - Non diabetic        Behavioral    Tobacco Use Yes  Chemical Dependency No   Celia indicated she smokes eight cigarettes a day and is working on quitting.        Psychiatric Impairment No    Reading ability Good  Education Level: High School Recent Legal History No    Coping Style/Strategies: Celia indicated when under stress she will take a nap, walk or watch TV.   Ability to Adhere to Complex Medical Regime: Yes     Adherence  History:  Celia indicated she will usually follow her physician's recommendations.        Education  _X_ Medicare  _X_ Rehabilitation  _X_ Donor issues  _X_ Community resources  _X_ Post discharge housing  _X_ Financial resources  _X_ Medical insurance options  _X_ Psych adjustment  _X_ Family adjustment  _X_ Health Care Directive - Provided Education and Declined Completing at this time.  Celia indicated she is in agreement with Bryce making her medical decisions for her if she is unable.   Psychosocial Risks of Transplant Reviewed and Discussed:  _X_ Increased stress related to emotional,            family, social, employment or financial           situation  _X_ Affect on work and/or disability benefits  _X_ Affect on future life insurance  _X_ Transplant outcome expectations may           not be met  _X_ Mental Health Risks: anxiety,           depression, PTSD, guilt, grief and           chronic fatigue     Notable Items:   Encouraged Celia to contact her local county to learn what assistance if available for post transplant.      Final Evaluation/Assessment   Patient seemed to process information well. Appeared well informed, motivated and able to follow post transplant requirements. Behavior was appropriate during interview. Has adequate income and insurance coverage. Adequate social support. No major contraindications noted for transplant.  At this time patient appears to understand the risks and benefits of transplant.      Recommendation  Acceptable    Selection Criteria Met:  Plan for support Yes   Chemical Dependence Yes   Smoking No  Mental Health Yes   Adequate Finances Yes    Signature: URSZULA Johnson, [facility]   Title: Clinical

## 2020-01-02 NOTE — NURSING NOTE
Chief Complaint   Patient presents with     Consult     Pre kidney eval     Grace Garcia on 1/2/2020 at 7:37 AM

## 2020-01-03 LAB
BLD GP AB SCN TITR SERPL: NORMAL {TITER}
CARDIOLIPIN ANTIBODY IGG: <1.6 GPL-U/ML (ref 0–19.9)
CARDIOLIPIN ANTIBODY IGM: <0.2 MPL-U/ML (ref 0–19.9)
CMV IGG SERPL QL IA: 0.2 AI (ref 0–0.8)
EBV VCA IGG SER QL IA: >8 AI (ref 0–0.8)
PROTOCOL CUTOFF: NORMAL
SA1 CELL: NORMAL
SA1 COMMENTS: NORMAL
SA1 HI RISK ABY: NORMAL
SA1 MOD RISK ABY: NORMAL
SA1 TEST METHOD: NORMAL
SA2 CELL: NORMAL
SA2 COMMENTS: NORMAL
SA2 HI RISK ABY UA: NORMAL
SA2 MOD RISK ABY: NORMAL
SA2 TEST METHOD: NORMAL
T PALLIDUM AB SER QL: NONREACTIVE
THROMBIN TIME: 14.8 SEC (ref 13–19)
UNACCEPTABLE ANTIGEN: NORMAL
UNOS CPRA: 0
VZV IGG SER QL IA: >8 AI (ref 0–0.8)

## 2020-01-06 LAB
A* LOCUS: NORMAL
A*: NORMAL
ABTEST METHOD: NORMAL
B* LOCUS: NORMAL
B*: NORMAL
BW-1: NORMAL
C* LOCUS: NORMAL
C*: NORMAL
DPA1* NMDP: NORMAL
DPA1*: NORMAL
DPB1* LOCUS NMDP: NORMAL
DPB1* NMDP: NORMAL
DPB1*: NORMAL
DPB1*LOCUS: NORMAL
DQA1*: NORMAL
DQA1*LOCUS: NORMAL
DQB1* LOCUS: NORMAL
DQB1*: NORMAL
DRB1* LOCUS: NORMAL
DRB1*: NORMAL
DRB3* LOCUS: NORMAL
DRSSO TEST METHOD: NORMAL
GAMMA INTERFERON BACKGROUND BLD IA-ACNC: 0.04 IU/ML
LA PPP-IMP: NEGATIVE
M TB IFN-G BLD-IMP: NEGATIVE
M TB IFN-G CD4+ BCKGRND COR BLD-ACNC: >10 IU/ML
MITOGEN IGNF BCKGRD COR BLD-ACNC: 0 IU/ML
MITOGEN IGNF BCKGRD COR BLD-ACNC: 0 IU/ML

## 2020-01-07 LAB — COPATH REPORT: NORMAL

## 2020-01-08 ENCOUNTER — COMMITTEE REVIEW (OUTPATIENT)
Dept: TRANSPLANT | Facility: CLINIC | Age: 35
End: 2020-01-08

## 2020-01-08 NOTE — LETTER
January 8, 2020    Celia Munson  [id number] [address] [address]      Dear Celia,    It was a pleasure to see you here recently for consideration of a kidney transplant. Your pre-transplant evaluation began on January 2, 2020. Your evaluation results were discussed at our Multidisciplinary Selection Committee on January 8, 2020. The Committee approves you as a kidney transplant candidate pending the successful completion of your evaluation. We are asking for the following items:    1. Please establish care with a primary care provider. This is important for general viji care and follow up. You will need to have a blood test repeated with your primary care provider. Your WBC (white blood count) was slightly elevated. This usually means an infection or resolving infection, but we did not see anything concerning in your urine or chest xray. Call me when you have selected a primary care provider ( I need to add this person to your care team) and have the blood test done.  2. We are strongly encouraging you to stop smoking for your overall health.Your primary care provider is a good resource to help you with stopping smoking.  3. Please make an appointment with your primary care provider and schedule a PAP. Call me when complete so I can request those records.  4. Please make an appointment with your dentist and have any recommended work done. Call me when complete. Your insurance provider requires proof of the dental exam and completion of any recommended treatment. Your dentist can fax a simple note stating when you were seen and when you completed treatment and from a dental perspective may proceed with transplant. They can fax this note to [phone].  5. You will need the pneumovax vaccination against pneumonia. This can be done with  your primary care provider. Call me when complete.    You have already been added onto the kidney transplant wait list on 1/8/2020 but are on INACTIVE status due to needing to  successfully complete the above items. This means you are now accruing waiting time but are not yet eligible to receive organ offers. A separate letter regarding your listing has been mailed. You will be notified by our office as to when your status can be changed to  ACTIVE.    Please have any potential living donors register now online with our program to initiate their evaluation at [email or web address] or call [phone]. You will be notified by our office in the event of an approved living donor.    You will now have a new coordinator while on the waiting list. Her name is Shi Isidro [phone] and her LPN is Alize Arteaga [phone]    If you have any questions please feel free to call me at [phone].          Sincerely,       Eboni Moore RN, BSN Transplant Coordinator  Solid Organ Transplant [address]  [address] [facility], [address] [address]  Phone [phone]  Fax [phone]  [email or web address]      CC:Marck Frances

## 2020-01-08 NOTE — COMMITTEE REVIEW
Abdominal Committee Review Note     Evaluation Date:   Committee Review Date: 1/8/2020    Organ being evaluated for: Kidney    Transplant Phase: Referral  Transplant Status: Active    Transplant Coordinator: Elidia Moore  Transplant Surgeon:       Referring Physician: Marck Frances    Primary Diagnosis:   Secondary Diagnosis:     Committee Review Members:  Austin Up RD   Nephrology Payam Cooper MD, Refugio Barton, APRN CNP, Phi Bourgeois MD   Nurse Anuradha Calle, RN, Dona Simms, RN   Pharmacy Julio Goldstein, [facility]    - Clinical Cynthia Hernandez, [facility], Conchita Lemus, [facility]   Transplant Esha Berman PA-C, Desiree Chavez, RN, Mari Garces, RN, Shi Isidro, CYNDIE, Mary Ramirez, RN, Gisselle Kelley, CYNDIE, Elidia Moore, CYNDIE, Jennifer Hill MD, MD       Transplant Eligibility: Irreversible chronic kidney disease treated w/dialysis or expected need for dialysis    Committee Review Decision: Approved    Relative Contraindications: None    Absolute Contraindications: None    Committee Chair Jennifer Hill MD verbally attested to the committee's decision.    Committee Discussion Details: Reviewed medical records and evaluation results to date.Patient is approved as a kidney transplant candidate pending the successful completion of her evaluation. She will be listed as inactive with a qualifying GFR of 20 from 9/30/2019.She does not  need to see cardiology. Her WBC was slightly elevated and should be repeated in 2 weeks with her PCP. We are recommending she stop smoking. She will need her PAP and dental completed. Needs pneumovax. Patient will be called, transplant summary letter and inactive listing letter will be sent.

## 2020-01-08 NOTE — LETTER
2020    Celia Munson  [id number], [place] [address]      Dear Celia,    This letter is sent to confirm that you have nearly completed your transplant work-up and you are a candidate in the kidney transplant program at the [facility]. You were placed on the kidney inactive waitlist on 2020. This means you will accumulate waiting time but not receive  donor calls.       Items we will need from you:      We have received approval from you insurance company for the transplant procedure.  It is critical that you notify us if there is any change in your insurance.  It is also important that you familiarize yourself with the details of your specific insurance policy.  Our patient  is available to assist you if you should have any questions regarding your coverage.      An ALA or PRA blood sample will need to be sent here every 3 months to match you with  donors or any potential living donors.  If you need this testing, special mailing boxes (called mailers) will be sent to you directly from the Outreach Department. You should take the physician order form and the  to your home laboratory when it is time to for this testing to be done.  Additional mailers can be obtained by calling the Transplant Office and asking to speak to a kidney . I will let you know when to start having these labs drawn.      During this waiting period, we may request additional periodic laboratory tests with your primary physician.  It will be your responsibility to remind your physician to forward your results to the Transplant Office.      We need to be kept informed of any changes in your medical condition such as:    o changes in your medications,   o significant changes in your health  o significant infections (such as pneumonia or abscesses)  o blood transfusions  o any condition which requires hospitalization  o any  surgery  o If you start dialysis      Remember to complete any routine cancer screening tests required before your transplant.  This includes colonoscopy; prostrate screening for men, and mammogram and gynecologic testing for women, as well as dental work.  Your primary care clinic can assist you with arranging for these exams.  Remind your caregivers to forward copies of the records and final reports.    We want you to know that our program has physician and surgeon coverage 24 hours a day, 365 days a year. If this coverage changes or there are substantial program changes, you will be notified in writing by letter.     Attached is a letter from the [employer] ([employer]). It describes the services and information offered to patients by [employer] and the Organ Procurement and Transplantation Network.    We appreciate having had the opportunity to participate in your care.  If you have questions, please feel free to call the Transplant Office at [phone] or [phone].      Sincerely,       Kidney Transplant Program    Enclosures: Telephone Contact List, Travel Resources, [employer] Letter, Waitlist Information Update and While You Are Waiting  CC:   Marck Frances

## 2020-01-09 ENCOUNTER — TELEPHONE (OUTPATIENT)
Dept: TRANSPLANT | Facility: CLINIC | Age: 35
End: 2020-01-09

## 2020-01-09 NOTE — TELEPHONE ENCOUNTER
Called Celia to let her know we discussed her case at the Multidisciplinary Selection Committee on 1/8/2020. She is approved as a kidney transplant candidate and was listed as inactive status on 1/8/2020. She has a qualifying GFR of 20 on 9/30/2019. She will need to complete her evaluation before attaining active status. First, she needs to establish care with a primary care provider and I asked her to make that call today and let me know who she gets an appointment with and where. She will need a PAP, repeat of WBC (slightly elevated at evaluation) and a pneumovax vaccination. She will also need dental. Her insurance requires a PA so I did tell her this will mean a note from her dentist be submitted with all of her other clinical information for insurance approval. She is also encouraged to quit smoking. She knows she will have a new coordinator while on the wait list and was given that contact information. She does have potential donors and she was told they may go ahead and start their evaluations. Transplant summary and inactive listing letters have been sent.

## 2020-01-20 ENCOUNTER — TELEPHONE (OUTPATIENT)
Dept: TRANSPLANT | Facility: CLINIC | Age: 35
End: 2020-01-20

## 2020-01-20 NOTE — TELEPHONE ENCOUNTER
Pt called to report her PCP is Dr. Dhaval Dunham with [facility] in [place]. Reviewed pt's current GFR is 23 and to contact transplant team when GFR is 20 or less to review items needed for active status consideration. Pt is aware she should be completing her dental and pap smear. Pt reports having WBC repeated and received her pneumovax with her recently. Contact information provided.

## 2020-03-11 ENCOUNTER — HEALTH MAINTENANCE LETTER (OUTPATIENT)
Age: 35
End: 2020-03-11

## 2020-12-03 ENCOUNTER — MEDICAL CORRESPONDENCE (OUTPATIENT)
Dept: HEALTH INFORMATION MANAGEMENT | Facility: CLINIC | Age: 35
End: 2020-12-03

## 2021-01-03 ENCOUNTER — HEALTH MAINTENANCE LETTER (OUTPATIENT)
Age: 36
End: 2021-01-03

## 2021-01-11 ENCOUNTER — TELEPHONE (OUTPATIENT)
Dept: TRANSPLANT | Facility: CLINIC | Age: 36
End: 2021-01-11

## 2021-01-11 DIAGNOSIS — Z76.82 ORGAN TRANSPLANT CANDIDATE: ICD-10-CM

## 2021-01-11 DIAGNOSIS — N18.6 ESRD (END STAGE RENAL DISEASE) (H): ICD-10-CM

## 2021-01-11 NOTE — TELEPHONE ENCOUNTER
Coordinator called pt to review we received Dr. Frances's most recent progress note and pt's GFR=18. Wondering if pt would like to be active on the list or if she and Dr. Frances would like to monitor her GFR more as it fluctuates. Pt would like to remain inactive for now but will discuss with Dr. Frances at her next appoint (in 2-3 months). Pt will follow-up after appointment with transplant team      Pt reports she is still smoking, 8 cigarettes/day. Pt had attempted to stop at one point with patches, but was allergic to patches. Coordinator encouraged pt to try and at least reduce how much she is smoking. Pt verbalizes understanding. Contact information provided.

## 2021-04-25 ENCOUNTER — HEALTH MAINTENANCE LETTER (OUTPATIENT)
Age: 36
End: 2021-04-25

## 2021-05-06 ENCOUNTER — MEDICAL CORRESPONDENCE (OUTPATIENT)
Dept: HEALTH INFORMATION MANAGEMENT | Facility: CLINIC | Age: 36
End: 2021-05-06

## 2021-06-22 ENCOUNTER — TELEPHONE (OUTPATIENT)
Dept: TRANSPLANT | Facility: CLINIC | Age: 36
End: 2021-06-22

## 2021-06-22 DIAGNOSIS — Z76.82 ORGAN TRANSPLANT CANDIDATE: ICD-10-CM

## 2021-06-22 DIAGNOSIS — N18.9 CHRONIC KIDNEY DISEASE (CKD): ICD-10-CM

## 2021-06-22 NOTE — TELEPHONE ENCOUNTER
Coordinator called pt to see if she was interested in being active. Pt said she would be interested. Pt reports still smoking 6-7 cigarettes/day. Coordinator will clarify with team if PFTs are needed prior to active status. Will send HLA kits and order to pt to have an updated sample. Pt verbalized understanding of information and has no further questions. Encouraged to reach out if questions arise.

## 2021-06-23 ENCOUNTER — DOCUMENTATION ONLY (OUTPATIENT)
Dept: TRANSPLANT | Facility: CLINIC | Age: 36
End: 2021-06-23

## 2021-06-28 DIAGNOSIS — Z76.82 ORGAN TRANSPLANT CANDIDATE: ICD-10-CM

## 2021-06-28 DIAGNOSIS — N18.9 CHRONIC KIDNEY DISEASE (CKD): ICD-10-CM

## 2021-06-30 ENCOUNTER — COMMITTEE REVIEW (OUTPATIENT)
Dept: TRANSPLANT | Facility: CLINIC | Age: 36
End: 2021-06-30

## 2021-06-30 ENCOUNTER — DOCUMENTATION ONLY (OUTPATIENT)
Dept: TRANSPLANT | Facility: CLINIC | Age: 36
End: 2021-06-30

## 2021-06-30 ENCOUNTER — TELEPHONE (OUTPATIENT)
Dept: TRANSPLANT | Facility: CLINIC | Age: 36
End: 2021-06-30

## 2021-06-30 NOTE — TELEPHONE ENCOUNTER
Called patient to inform them of status change to ACTIVE  on 2021. Status change letter and PRA orders to be mailed. Patient is in agreement with listing ACTIVE status.      Discussed:   - Pt is eligible for  donor offers and pt can receive calls 24 hours a day, 7 days a week, and on call staff need to be able to reach pt or one of emergency contacts within one hour or they might skip over to next recipient on list.   - Reviewed organ offer process including request to accept or deny offer, without penalty  - Expected length of surgical procedure, expected inpatient length of stay post transplant, and potential to stay locally post transplant.    - Verified contact information as documented in Epic   -Instructed patient about importance of having PRAs drawn every 3 months via: (Mailers/[facility]).   -Reminded pt to stay up on health maintenance and to call with any updates on their health status, insurance or contact information.   -Reminded pt to inform RNCC as soon as possible if they test positive for COVID.  -Reviewed/emphasized pt needs to continue reducing and hopefully stop smoking. Encouraged pt to consider our smoking cessation that we offer our patients.     -Provided name and contact information for additional questions or concerns.  Pt expressed understanding of all and was in good agreement with the plan.

## 2021-06-30 NOTE — COMMITTEE REVIEW
Abdominal Committee Review Note     Evaluation Date: 1/2/2020  Committee Review Date: 6/30/2021    Organ being evaluated for: Kidney    Transplant Phase: Waitlist  Transplant Status: Inactive    Transplant Coordinator: Shi Isidro  Transplant Surgeon:       Referring Physician: Marck Frances    Primary Diagnosis: Hypoplasia/Dysplasia/Dysgenesis/Agenesis  Secondary Diagnosis: Congenital Obstructive Uropathy    Committee Review Members:  Nephrology Refugio Barton, APRN [facility]   Pharmacy Naye Luu, [facility]    - Clinical Cynthia Hernandez, [facility], Conchita Lemus, [facility]   Transplant Esha Berman PA-C, Mari Garces, RN, Esperanza Viramontes, RN, Shi Isidro, RN, Pedro Davis, RN, Mary Ramirez, CYNDIE, Michael Malave MD, Kathleen Garland, CYNDIE, Elidia Moore, RN   Transplant Surgery Michael Malave MD       Transplant Eligibility:     Committee Review Decision: Make Active    Relative Contraindications:     Absolute Contraindications:     Committee Chair Michael Malave MD verbally attested to the committee's decision.    Committee Discussion Details: Pt presented to team. Pt has completed her workup and her GFR is now below 20. Pt still smoking but team does not feel we need baseline PFTs at this time (BMI ok and exercise tolerance ok). Team approves pt for active status.

## 2021-07-02 LAB
PROTOCOL CUTOFF: NORMAL
SA1 CELL: NORMAL
SA1 COMMENTS: NORMAL
SA1 HI RISK ABY: NORMAL
SA1 MOD RISK ABY: NORMAL
SA1 TEST METHOD: NORMAL
SA2 CELL: NORMAL
SA2 COMMENTS: NORMAL
SA2 HI RISK ABY UA: NORMAL
SA2 MOD RISK ABY: NORMAL
SA2 TEST METHOD: NORMAL
UNACCEPTABLE ANTIGEN: NORMAL
UNOS CPRA: 0

## 2021-09-20 ENCOUNTER — LAB (OUTPATIENT)
Dept: LAB | Facility: CLINIC | Age: 36
End: 2021-09-20
Payer: COMMERCIAL

## 2021-09-20 DIAGNOSIS — N18.9 CHRONIC KIDNEY DISEASE (CKD): ICD-10-CM

## 2021-09-20 DIAGNOSIS — Z76.82 ORGAN TRANSPLANT CANDIDATE: ICD-10-CM

## 2021-09-20 PROCEDURE — 86833 HLA CLASS II HIGH DEFIN QUAL: CPT

## 2021-09-20 PROCEDURE — 86832 HLA CLASS I HIGH DEFIN QUAL: CPT

## 2021-09-24 LAB
PROTOCOL CUTOFF: NORMAL
SA 1 CELL: NORMAL
SA 1 TEST METHOD: NORMAL
SA 2 CELL: NORMAL
SA 2 TEST METHOD: NORMAL
SA1 HI RISK ABY: NORMAL
SA1 MOD RISK ABY: NORMAL
SA2 HI RISK ABY: NORMAL
SA2 MOD RISK ABY: NORMAL
UNACCEPTABLE ANTIGENS: NORMAL
UNOS CPRA: 10
ZZZSA 1  COMMENTS: NORMAL
ZZZSA 2 COMMENTS: NORMAL

## 2021-10-10 ENCOUNTER — HEALTH MAINTENANCE LETTER (OUTPATIENT)
Age: 36
End: 2021-10-10

## 2021-12-08 ENCOUNTER — DOCUMENTATION ONLY (OUTPATIENT)
Dept: TRANSPLANT | Facility: CLINIC | Age: 36
End: 2021-12-08

## 2021-12-13 ENCOUNTER — LAB (OUTPATIENT)
Dept: LAB | Facility: CLINIC | Age: 36
End: 2021-12-13
Payer: COMMERCIAL

## 2021-12-13 DIAGNOSIS — Z76.82 ORGAN TRANSPLANT CANDIDATE: ICD-10-CM

## 2021-12-13 DIAGNOSIS — N18.9 CHRONIC KIDNEY DISEASE (CKD): ICD-10-CM

## 2021-12-13 PROCEDURE — 86833 HLA CLASS II HIGH DEFIN QUAL: CPT

## 2021-12-13 PROCEDURE — 86832 HLA CLASS I HIGH DEFIN QUAL: CPT

## 2021-12-13 PROCEDURE — 36415 COLL VENOUS BLD VENIPUNCTURE: CPT

## 2022-01-04 ENCOUNTER — TELEPHONE (OUTPATIENT)
Dept: TRANSPLANT | Facility: CLINIC | Age: 37
End: 2022-01-04
Payer: COMMERCIAL

## 2022-01-04 NOTE — TELEPHONE ENCOUNTER
Called pt to do wellness call and introduce self as coordinator. Left VM with direct line for return call.

## 2022-01-07 ENCOUNTER — TELEPHONE (OUTPATIENT)
Dept: TRANSPLANT | Facility: CLINIC | Age: 37
End: 2022-01-07
Payer: COMMERCIAL

## 2022-01-07 NOTE — TELEPHONE ENCOUNTER
Well-Check Questionnaire    January 7, 2022  Patient: Celia  Interviewer: Nehal       1. Have you been admitted to the hospital since we last saw you or had a recent ED visits? No  o If yes, what were you hospitalized for?    o What facility were you admitted to?    o When did this occur?    o Did you complete all the recommended follow-up testing and visits, if applicable?  2. Have you had any surgeries or procedures since we last saw you?  No  o If yes, what procedures were completed?    o What facility performed the procedure?    o When did this occur?    o Did you complete all the recommended follow-up testing and visits, if applicable?    3. Do you or have you ever been told you have PVD and/or other vascular issues?  No  o If yes, what have you been diagnosed with?    o Who is the physician treating this issue?    4. Do you or have you ever been told you have Cardiac problems and/or issues?  No  o If yes, what have you been diagnosed with?    o Who is the physician treating this issue?    5. Do you or have you ever been told you have Pulmonary problems and/or Issues?  No  o If yes, what have you been diagnosed with?    o Who is the physician treating this issue?    6. Do you have any current medical issues that are requiring the monitoring of a physician, that you did not note above (examples: open or non-healing wounds, hematologic conditions, etc.)? No   o If yes, what have you been diagnosed with?    o Who is the physician treating this issue?    7. Do you currently use or take any of the following:  o Oxygen No  i. If yes, how many liters and how often do you use it?  o Midodrine (for hypotension) No  i. If yes, what is your current dosage and how often used?  o Florinef (for hypotension)  No  i. If yes, what is your current dosage and how often used?    o Anticoagulation or anti-platelet medications including Coumadin/Warfarin, Plavix, Eliquis, Pradaxa, Brilinta, and/or Aspirin?  No   i. If you, what  medication are you taking and indication for use?    o Antibiotic(s)  No  i. If yes, what medication are you taking and indication for use?    8. Describe your functional status, your ability to walk around and care for yourself:  o How far can you walk without stopping (Example: 1-2 Blocks)? No issues with mobility able to walk more than 1-2 blocks.   o Do you have any limitations or use assistive devices such as a walker or cane? Nope   o Are you able to complete your Activities of Daily Living (ADL's) without         assistance? Yes  9. Are you currently on Dialysis?  No   o If yes, which type (Hemodialysis or Peritoneal)?    o What center do you attend?    o Which days of the week do you do dialysis?    10. Describe your current social situation:  o Where and with whom do you reside?  Lives in [place], [place] in a house with  and 4 children.   o What is your Post-Transplant Caregiver Plan?   will be caregiver  o What is your Post-Transplant Lodging Plan?  Plan to go back home.   11. What is your current Insurance coverage?  [employer]  o Has this changed since you were last seen by us?  nope  12. Do you have any questions or concerns related to your transplant listing/next steps in the transplant process?  No questions at this time. Gave direct line should further questions arise. Discussed that COVID vaccine and flu vaccine is going to be required for pts to be active on the transplant list in the next 1-2 months. Recommended setting up an appt with her PCP to get Vaccines completed.  RNCC will follow up with pt at the end of Feb to see if received Vaccinations.

## 2022-02-18 ENCOUNTER — TELEPHONE (OUTPATIENT)
Dept: TRANSPLANT | Facility: CLINIC | Age: 37
End: 2022-02-18
Payer: COMMERCIAL

## 2022-02-18 NOTE — TELEPHONE ENCOUNTER
Called pt back after receiving [facility] message with concerns related to the vaccine. Informed pt that the vaccine will be mandated after March 1st. Offered a phone call with neph provider to disucss. Pt would like to speak with her PCP before agreeing to vaccination. Pt will let RNCC know once she has discussed with her provider.

## 2022-02-18 NOTE — TELEPHONE ENCOUNTER
Called pt to see if she is going to be getting the covid vaccine as a mandate date has been set for March 1. Left  with direct line for return call.

## 2022-02-28 ENCOUNTER — TELEPHONE (OUTPATIENT)
Dept: TRANSPLANT | Facility: CLINIC | Age: 37
End: 2022-02-28
Payer: COMMERCIAL

## 2022-02-28 NOTE — TELEPHONE ENCOUNTER
Called pt to check in on appt with PCP regarding vaccine. Pt has an appt tomorrow 3/1/22 at 4:10PM. RNCC will email forms to pt and follow up after appt.

## 2022-03-07 ENCOUNTER — LAB (OUTPATIENT)
Dept: LAB | Facility: CLINIC | Age: 37
End: 2022-03-07
Payer: COMMERCIAL

## 2022-03-07 DIAGNOSIS — N18.9 CHRONIC KIDNEY DISEASE (CKD): ICD-10-CM

## 2022-03-07 DIAGNOSIS — Z76.82 ORGAN TRANSPLANT CANDIDATE: ICD-10-CM

## 2022-03-07 PROCEDURE — 86832 HLA CLASS I HIGH DEFIN QUAL: CPT

## 2022-03-07 PROCEDURE — 36415 COLL VENOUS BLD VENIPUNCTURE: CPT

## 2022-03-07 PROCEDURE — 86833 HLA CLASS II HIGH DEFIN QUAL: CPT

## 2022-03-13 LAB
PROTOCOL CUTOFF: NORMAL
SA 1 CELL: NORMAL
SA 1 TEST METHOD: NORMAL
SA 2 CELL: NORMAL
SA 2 TEST METHOD: NORMAL
SA1 HI RISK ABY: NORMAL
SA1 MOD RISK ABY: NORMAL
SA2 HI RISK ABY: NORMAL
SA2 MOD RISK ABY: NORMAL
UNACCEPTABLE ANTIGENS: NORMAL
UNOS CPRA: 25
ZZZSA 1  COMMENTS: NORMAL
ZZZSA 2 COMMENTS: NORMAL

## 2022-03-16 ENCOUNTER — TELEPHONE (OUTPATIENT)
Dept: TRANSPLANT | Facility: CLINIC | Age: 37
End: 2022-03-16
Payer: COMMERCIAL

## 2022-03-16 ENCOUNTER — DOCUMENTATION ONLY (OUTPATIENT)
Dept: TRANSPLANT | Facility: CLINIC | Age: 37
End: 2022-03-16

## 2022-03-16 ENCOUNTER — COMMITTEE REVIEW (OUTPATIENT)
Dept: TRANSPLANT | Facility: CLINIC | Age: 37
End: 2022-03-16
Payer: COMMERCIAL

## 2022-03-16 NOTE — COMMITTEE REVIEW
Abdominal Patient Discussion Note Transplant Coordinator: Nehal Ren  Transplant Surgeon:       Referring Physician: Marck Frances    Committee Review Members:  [place], Chery Oliva RD, LD   Nephrology Alec Myers MD, Refugio Barton APRN CNP, Soham Carl MD, Theresa Dave MD, Ariel Jensen MD, Phi Bourgeois MD   Pharmacy Bruce Arellano, [facility]    - Clinical [facility] Kristine Lemus, [facility]   Transplant Esha Berman PA-C, Mari Garces RN, Mike Borja MD, Esperanza Viramontes, RN, Nehal Ren, RN, Eli Warren, NP, Pedro Davis, RN, Mary Ramirez, RN, Kathleen Garland, RN, Elidia Moore, RN   Transplant Surgery Jennifer Hill MD, MD       Additional Discussion Notes and Findings: Pt made inactive on the kidney transplant list on 3/16/22 due to being diagnosed with Malignant melanoma. Pt will need oncology clearance prior to being brought back to committee for active status consideration.

## 2022-03-16 NOTE — TELEPHONE ENCOUNTER
Called patient to inform them of status change on Kidney Waitlist. Pt changed to inactive on 3/16/22 due to Malignant melanoma diagnosis. Pt was informed that they are still accruing time on the waitlist, they just cannot receive  donor offers at this time. Status change letter will be sent to patient. Pt verbalized understanding of information and has no further questions. Encouraged to reach out if questions arise.

## 2022-03-29 ENCOUNTER — TELEPHONE (OUTPATIENT)
Dept: TRANSPLANT | Facility: CLINIC | Age: 37
End: 2022-03-29

## 2022-03-29 NOTE — TELEPHONE ENCOUNTER
Patient verbally consents to Eplet testing; understands [employer] consent will be emailed as well.

## 2022-05-04 ENCOUNTER — TELEPHONE (OUTPATIENT)
Dept: TRANSPLANT | Facility: CLINIC | Age: 37
End: 2022-05-04
Payer: COMMERCIAL

## 2022-05-04 ENCOUNTER — COMMITTEE REVIEW (OUTPATIENT)
Dept: TRANSPLANT | Facility: CLINIC | Age: 37
End: 2022-05-04
Payer: COMMERCIAL

## 2022-05-04 NOTE — TELEPHONE ENCOUNTER
Called pt to update on committees decision. Pt will need to remain inactive and have a 5 year wait time after completion of immunotherapy. Pt recently started treatment and will be getting treatment for the next year. Pt verbalized understanding of information and has no further questions. Encouraged to reach out if questions arise.

## 2022-05-04 NOTE — COMMITTEE REVIEW
Abdominal Committee Review Note     Evaluation Date: 1/2/2020  Committee Review Date: 5/4/2022    Organ being evaluated for: Kidney    Transplant Phase: Waitlist  Transplant Status: Inactive    Transplant Coordinator: Nehal Ren  Transplant Surgeon:       Referring Physician: Marck Frances    Primary Diagnosis: Hypoplasia/Dysplasia/Dysgenesis/Agenesis  Secondary Diagnosis: Congenital Obstructive Uropathy    Committee Review Members:  Chery Avila RD   Nephrology Refugio Barton, APRN CNP, Soham Carl MD   Pharmacy Bruce Arellano, [facility]    - Clinical Conchita Lemus, [facility], Nehal Mathur, [facility]   Transplant Esah Berman PA-C, Esperanza Viramontes, RN, Nehal Ren, RN, Eli Warren, GOPAL, Pedro Davis, CYNDIE, Mary Ramirez, CYNDIE, Michael Malave MD, Kathleen Garland RN   Transplant Surgery Michael Malave MD       Transplant Eligibility: Irreversible chronic kidney disease treated w/dialysis or expected need for dialysis    Committee Review Decision: Remain Inactive      Committee Chair Michael Malave MD verbally attested to the committee's decision.    Committee Discussion Details:  -Pt was made inactive in March due to being diagnosed with stage IIIA malignant melanoma on 3/1/22 s/p wide local excision with right axillary sentinel lymph node biopsies, 1 of 4 positive for multifocal involvement. PET scan showed no distant metastases. Pt will be receiving adjuvant immunotherapy (pembrolizumab IV every 3 weeks for the next year). For lymph node they are going to monitor with a f/u US in 4 months.    Committee determined that pt will need a 5 year wait time after completion of treatment. If no recurrence in 5 years, pt can be brought back for updated visits for active status consideration.

## 2022-05-22 ENCOUNTER — HEALTH MAINTENANCE LETTER (OUTPATIENT)
Age: 37
End: 2022-05-22

## 2022-09-18 ENCOUNTER — HEALTH MAINTENANCE LETTER (OUTPATIENT)
Age: 37
End: 2022-09-18

## 2023-05-07 ENCOUNTER — HEALTH MAINTENANCE LETTER (OUTPATIENT)
Age: 38
End: 2023-05-07

## 2024-05-05 ENCOUNTER — HEALTH MAINTENANCE LETTER (OUTPATIENT)
Age: 39
End: 2024-05-05

## 2024-06-28 ENCOUNTER — TELEPHONE (OUTPATIENT)
Dept: TRANSPLANT | Facility: CLINIC | Age: 39
End: 2024-06-28
Payer: COMMERCIAL

## 2024-06-28 NOTE — TELEPHONE ENCOUNTER
Called pt to check in. Pt currently inactive due to malignant melanoma. Wait time up in 2028. Left VM with direct line for return call.

## 2025-07-10 ENCOUNTER — TELEPHONE (OUTPATIENT)
Dept: TRANSPLANT | Facility: CLINIC | Age: 40
End: 2025-07-10
Payer: COMMERCIAL